# Patient Record
Sex: FEMALE | Race: WHITE | NOT HISPANIC OR LATINO | URBAN - METROPOLITAN AREA
[De-identification: names, ages, dates, MRNs, and addresses within clinical notes are randomized per-mention and may not be internally consistent; named-entity substitution may affect disease eponyms.]

---

## 2020-10-23 ENCOUNTER — OFFICE VISIT (OUTPATIENT)
Dept: URBAN - METROPOLITAN AREA CLINIC 63 | Facility: CLINIC | Age: 65
End: 2020-10-23

## 2022-07-09 ENCOUNTER — TELEPHONE ENCOUNTER (OUTPATIENT)
Dept: URBAN - METROPOLITAN AREA CLINIC 121 | Facility: CLINIC | Age: 67
End: 2022-07-09

## 2022-07-09 RX ORDER — BUPROPION HYDROCHLORIDE 150 MG/1
TABLET, FILM COATED, EXTENDED RELEASE ORAL
Refills: 0 | OUTPATIENT
Start: 2020-01-31 | End: 2020-02-24

## 2022-07-09 RX ORDER — MONTELUKAST SODIUM 10 MG/1
TABLET, FILM COATED ORAL
Refills: 0 | OUTPATIENT
Start: 2018-03-26 | End: 2018-05-09

## 2022-07-09 RX ORDER — ROSUVASTATIN CALCIUM 20 MG
TABLET ORAL
Refills: 0 | OUTPATIENT
Start: 2020-01-09 | End: 2020-01-31

## 2022-07-09 RX ORDER — LORATADINE 5 MG
TABLET,CHEWABLE ORAL THREE TIMES A DAY
Refills: 0 | OUTPATIENT
Start: 2020-01-31 | End: 2020-02-24

## 2022-07-09 RX ORDER — EZETIMIBE 10 MG/1
TABLET ORAL ONCE A DAY
Refills: 0 | OUTPATIENT
Start: 2020-01-31 | End: 2020-02-24

## 2022-07-09 RX ORDER — MONTELUKAST SODIUM 10 MG/1
TABLET, FILM COATED ORAL
Refills: 0 | OUTPATIENT
Start: 2018-05-09 | End: 2020-01-09

## 2022-07-09 RX ORDER — CELECOXIB 200 MG
CAPSULE ORAL
Refills: 0 | OUTPATIENT
Start: 2018-03-26 | End: 2018-05-09

## 2022-07-09 RX ORDER — ZOLMITRIPTAN 5 MG/1
TABLET ORAL
Refills: 0 | OUTPATIENT
Start: 2018-05-09 | End: 2020-01-09

## 2022-07-09 RX ORDER — ZOLMITRIPTAN 5 MG/1
TABLET ORAL
Refills: 0 | OUTPATIENT
Start: 2020-01-09 | End: 2020-01-31

## 2022-07-09 RX ORDER — ZOLMITRIPTAN 5 MG/1
TABLET, FILM COATED ORAL
Refills: 0 | OUTPATIENT
Start: 2019-11-18 | End: 2020-01-09

## 2022-07-09 RX ORDER — OXYCODONE HYDROCHLORIDE 30 MG/1
TABLET ORAL TWICE A DAY
Refills: 0 | OUTPATIENT
Start: 2018-05-09 | End: 2020-01-09

## 2022-07-09 RX ORDER — CELECOXIB 200 MG
CAPSULE ORAL
Refills: 0 | OUTPATIENT
Start: 2020-01-31 | End: 2020-02-24

## 2022-07-09 RX ORDER — OXYCODONE HYDROCHLORIDE 15 MG/1
TABLET ORAL
Refills: 0 | OUTPATIENT
Start: 2020-01-09 | End: 2020-01-31

## 2022-07-09 RX ORDER — OXYCODONE HYDROCHLORIDE 15 MG/1
TABLET ORAL
Refills: 0 | OUTPATIENT
Start: 2019-12-10 | End: 2020-01-09

## 2022-07-09 RX ORDER — CELECOXIB 200 MG
CAPSULE ORAL
Refills: 0 | OUTPATIENT
Start: 2018-05-09 | End: 2020-01-09

## 2022-07-09 RX ORDER — OXYCODONE HYDROCHLORIDE 30 MG/1
TABLET ORAL TWICE A DAY
Refills: 0 | OUTPATIENT
Start: 2018-03-26 | End: 2018-05-09

## 2022-07-09 RX ORDER — CELECOXIB 200 MG
CAPSULE ORAL
Refills: 0 | OUTPATIENT
Start: 2020-01-09 | End: 2020-01-31

## 2022-07-09 RX ORDER — LORATADINE 5 MG
TABLET,CHEWABLE ORAL THREE TIMES A DAY
Refills: 0 | OUTPATIENT
Start: 2020-01-09 | End: 2020-01-31

## 2022-07-09 RX ORDER — ZOLMITRIPTAN 5 MG/1
TABLET ORAL
Refills: 0 | OUTPATIENT
Start: 2020-01-31 | End: 2020-02-24

## 2022-07-09 RX ORDER — BUPROPION HYDROCHLORIDE 150 MG/1
TABLET, FILM COATED, EXTENDED RELEASE ORAL
Refills: 0 | OUTPATIENT
Start: 2020-01-09 | End: 2020-01-31

## 2022-07-09 RX ORDER — ROSUVASTATIN CALCIUM 20 MG
TABLET ORAL
Refills: 0 | OUTPATIENT
Start: 2020-01-31 | End: 2020-02-24

## 2022-07-09 RX ORDER — ZOLMITRIPTAN 5 MG/1
TABLET ORAL
Refills: 0 | OUTPATIENT
Start: 2018-03-26 | End: 2018-05-09

## 2022-07-09 RX ORDER — EZETIMIBE 10 MG/1
TABLET ORAL ONCE A DAY
Refills: 0 | OUTPATIENT
Start: 2020-01-09 | End: 2020-01-31

## 2022-07-09 RX ORDER — ALPRAZOLAM 0.25 MG/1
TABLET ORAL
Refills: 0 | OUTPATIENT
Start: 2020-01-09 | End: 2020-01-09

## 2022-07-09 RX ORDER — ROSUVASTATIN CALCIUM 20 MG
TABLET ORAL
Refills: 0 | OUTPATIENT
Start: 2018-05-09 | End: 2020-01-09

## 2022-07-09 RX ORDER — OXYCODONE HYDROCHLORIDE 15 MG/1
TABLET ORAL
Refills: 0 | OUTPATIENT
Start: 2020-01-31 | End: 2020-02-24

## 2022-07-09 RX ORDER — ALPRAZOLAM 0.25 MG/1
TABLET ORAL
Refills: 0 | OUTPATIENT
Start: 2019-12-19 | End: 2020-01-09

## 2022-07-09 RX ORDER — ROSUVASTATIN CALCIUM 20 MG
TABLET ORAL
Refills: 0 | OUTPATIENT
Start: 2018-03-26 | End: 2018-05-09

## 2022-07-09 RX ORDER — EZETIMIBE 10 MG/1
TABLET ORAL ONCE A DAY
Refills: 0 | OUTPATIENT
Start: 2019-12-31 | End: 2020-01-09

## 2022-07-10 ENCOUNTER — TELEPHONE ENCOUNTER (OUTPATIENT)
Dept: URBAN - METROPOLITAN AREA CLINIC 121 | Facility: CLINIC | Age: 67
End: 2022-07-10

## 2022-07-10 RX ORDER — OXYCODONE HYDROCHLORIDE 15 MG/1
TABLET ORAL
Refills: 0 | Status: ACTIVE | COMMUNITY
Start: 2020-02-24

## 2022-07-10 RX ORDER — CELECOXIB 200 MG
CAPSULE ORAL
Refills: 0 | Status: ACTIVE | COMMUNITY
Start: 2020-02-24

## 2022-07-10 RX ORDER — BUPROPION HYDROCHLORIDE 150 MG/1
TABLET, FILM COATED, EXTENDED RELEASE ORAL
Refills: 0 | Status: ACTIVE | COMMUNITY
Start: 2020-02-24

## 2022-07-10 RX ORDER — ROSUVASTATIN CALCIUM 20 MG
TABLET ORAL
Refills: 0 | Status: ACTIVE | COMMUNITY
Start: 2020-02-24

## 2022-07-10 RX ORDER — DICYCLOMINE HYDROCHLORIDE 20 MG/1
UP FOUR TIMES A DAY, PO, AS NEEDED FOR ABDOMINAL CRAMPING TABLET ORAL
Refills: 3 | Status: ACTIVE | COMMUNITY
Start: 2020-01-09

## 2022-07-10 RX ORDER — EZETIMIBE 10 MG/1
TABLET ORAL ONCE A DAY
Refills: 0 | Status: ACTIVE | COMMUNITY
Start: 2020-02-24

## 2022-07-10 RX ORDER — ZOLMITRIPTAN 5 MG/1
TABLET ORAL
Refills: 0 | Status: ACTIVE | COMMUNITY
Start: 2020-02-24

## 2022-07-10 RX ORDER — LORATADINE 5 MG
TABLET,CHEWABLE ORAL THREE TIMES A DAY
Refills: 0 | Status: ACTIVE | COMMUNITY
Start: 2020-02-24

## 2022-07-10 RX ORDER — LUBIPROSTONE 8 UG/1
TWICE A DAY, PO CAPSULE, GELATIN COATED ORAL TWICE A DAY
Refills: 0 | Status: ACTIVE | COMMUNITY
Start: 2020-02-24

## 2022-10-28 ENCOUNTER — OFFICE VISIT (OUTPATIENT)
Dept: URBAN - METROPOLITAN AREA CLINIC 63 | Facility: CLINIC | Age: 67
End: 2022-10-28

## 2022-11-08 ENCOUNTER — OFFICE VISIT (OUTPATIENT)
Dept: URBAN - METROPOLITAN AREA CLINIC 63 | Facility: CLINIC | Age: 67
End: 2022-11-08

## 2022-11-08 ENCOUNTER — OFFICE VISIT (OUTPATIENT)
Dept: URBAN - METROPOLITAN AREA CLINIC 63 | Facility: CLINIC | Age: 67
End: 2022-11-08
Payer: MEDICARE

## 2022-11-08 ENCOUNTER — WEB ENCOUNTER (OUTPATIENT)
Dept: URBAN - METROPOLITAN AREA CLINIC 63 | Facility: CLINIC | Age: 67
End: 2022-11-08

## 2022-11-08 VITALS
HEART RATE: 87 BPM | HEIGHT: 66 IN | BODY MASS INDEX: 29.89 KG/M2 | TEMPERATURE: 97.9 F | SYSTOLIC BLOOD PRESSURE: 140 MMHG | WEIGHT: 186 LBS | OXYGEN SATURATION: 96 % | DIASTOLIC BLOOD PRESSURE: 90 MMHG

## 2022-11-08 DIAGNOSIS — D12.6 ADENOMATOUS POLYP OF COLON, UNSPECIFIED PART OF COLON: ICD-10-CM

## 2022-11-08 PROCEDURE — 99213 OFFICE O/P EST LOW 20 MIN: CPT | Performed by: INTERNAL MEDICINE

## 2022-11-08 RX ORDER — PREDNISOLONE ACETATE 10 MG/ML
INSTILL ONE DROP INTO THE RIGHT EYE TWICE DAILY - SHAKE WELL SUSPENSION/ DROPS OPHTHALMIC
Qty: 5 UNSPECIFIED | Refills: 0 | Status: ACTIVE | COMMUNITY

## 2022-11-08 RX ORDER — ZOLMITRIPTAN 5 MG/1
TAKE ONE TABLET BY MOUTH ONE TIME DAILY AS NEEDED TABLET, FILM COATED ORAL
Qty: 8 UNSPECIFIED | Refills: 0 | Status: ACTIVE | COMMUNITY

## 2022-11-08 RX ORDER — BUPROPION HYDROCHLORIDE 150 MG/1
TABLET, FILM COATED, EXTENDED RELEASE ORAL
Refills: 0 | Status: ACTIVE | COMMUNITY
Start: 2020-02-24

## 2022-11-08 RX ORDER — OXYCODONE HYDROCHLORIDE 10 MG/1
TABLET ORAL
Qty: 90 TABLET | Status: ACTIVE | COMMUNITY

## 2022-11-08 NOTE — HPI-TODAY'S VISIT:
Rebecca is a pleasant 67-year-old woman with a history of coronary artery disease status post PTCA, hyperlipidemia, chronic renal sufficiency, MDD, family history of "bowel" cancer.  She has surgical history of cholecystectomy, shoulder and knee procedures.  Some chronic constipation for which she had previously but been put on Amitiza 8 mcg daily.  Her most recent colonoscopy was in 2018 which revealed melanosis and a 10 mm rectal tubular adenoma. Today phone reports that she has some intermittent cramping but seems to be improving.  It is relatively short-lived and does not seem to be problematic.  She does take oxycodone for chronic pain.  She tells me that her cardiac issues 15 years ago was a viral pericarditis that resolved.  2 or 3 years ago she had some shortness of breath and went to see cardiologist and had a work-up that was negative.  Her shortness of breath resolved.  Colonoscopy is indicated for surveillance of advanced adenoma.

## 2022-11-15 ENCOUNTER — TELEPHONE ENCOUNTER (OUTPATIENT)
Dept: URBAN - METROPOLITAN AREA CLINIC 63 | Facility: CLINIC | Age: 67
End: 2022-11-15

## 2022-11-18 ENCOUNTER — OFFICE VISIT (OUTPATIENT)
Dept: URBAN - METROPOLITAN AREA SURGERY CENTER 4 | Facility: SURGERY CENTER | Age: 67
End: 2022-11-18
Payer: MEDICARE

## 2022-11-18 ENCOUNTER — CLAIMS CREATED FROM THE CLAIM WINDOW (OUTPATIENT)
Dept: URBAN - METROPOLITAN AREA CLINIC 4 | Facility: CLINIC | Age: 67
End: 2022-11-18
Payer: MEDICARE

## 2022-11-18 DIAGNOSIS — D12.5 SIGMOID POLYP: ICD-10-CM

## 2022-11-18 DIAGNOSIS — K57.30 DIVERTCULOSIS OF LG INT W/O PERFORATION OR ABSCESS W/O BLEEDING: ICD-10-CM

## 2022-11-18 DIAGNOSIS — D12.3 POLYP OF TRANSVERSE COLON: ICD-10-CM

## 2022-11-18 DIAGNOSIS — D12.7 BENIGN NEOPLASM OF RECTOSIGMOID JUNCTION: ICD-10-CM

## 2022-11-18 DIAGNOSIS — K62.1 RECTAL POLYP: ICD-10-CM

## 2022-11-18 DIAGNOSIS — Z86.010 PERSONAL HISTORY OF COLONIC POLYPS: ICD-10-CM

## 2022-11-18 DIAGNOSIS — D12.3 BENIGN NEOPLASM OF TRANSVERSE COLON: ICD-10-CM

## 2022-11-18 PROCEDURE — 88305 TISSUE EXAM BY PATHOLOGIST: CPT | Performed by: PATHOLOGY

## 2022-11-18 PROCEDURE — 45385 COLONOSCOPY W/LESION REMOVAL: CPT | Performed by: CLINIC/CENTER

## 2022-11-18 PROCEDURE — 45380 COLONOSCOPY AND BIOPSY: CPT | Performed by: CLINIC/CENTER

## 2022-11-18 PROCEDURE — 45385 COLONOSCOPY W/LESION REMOVAL: CPT | Performed by: INTERNAL MEDICINE

## 2022-11-18 PROCEDURE — 45380 COLONOSCOPY AND BIOPSY: CPT | Performed by: INTERNAL MEDICINE

## 2022-11-18 RX ORDER — BUPROPION HYDROCHLORIDE 150 MG/1
TABLET, FILM COATED, EXTENDED RELEASE ORAL
Refills: 0 | Status: ACTIVE | COMMUNITY
Start: 2020-02-24

## 2022-11-18 RX ORDER — OXYCODONE HYDROCHLORIDE 10 MG/1
TABLET ORAL
Qty: 90 TABLET | Status: ACTIVE | COMMUNITY

## 2022-11-18 RX ORDER — ZOLMITRIPTAN 5 MG/1
TAKE ONE TABLET BY MOUTH ONE TIME DAILY AS NEEDED TABLET, FILM COATED ORAL
Qty: 8 UNSPECIFIED | Refills: 0 | Status: ACTIVE | COMMUNITY

## 2022-11-18 RX ORDER — PREDNISOLONE ACETATE 10 MG/ML
INSTILL ONE DROP INTO THE RIGHT EYE TWICE DAILY - SHAKE WELL SUSPENSION/ DROPS OPHTHALMIC
Qty: 5 UNSPECIFIED | Refills: 0 | Status: ACTIVE | COMMUNITY

## 2022-12-09 ENCOUNTER — OFFICE VISIT (OUTPATIENT)
Dept: URBAN - METROPOLITAN AREA CLINIC 63 | Facility: CLINIC | Age: 67
End: 2022-12-09

## 2022-12-10 PROBLEM — 398050005 DIVERTICULAR DISEASE OF COLON: Status: ACTIVE | Noted: 2022-12-10

## 2022-12-28 ENCOUNTER — TELEPHONE ENCOUNTER (OUTPATIENT)
Dept: URBAN - METROPOLITAN AREA CLINIC 7 | Facility: CLINIC | Age: 67
End: 2022-12-28

## 2022-12-28 ENCOUNTER — DASHBOARD ENCOUNTERS (OUTPATIENT)
Age: 67
End: 2022-12-28

## 2022-12-28 ENCOUNTER — OFFICE VISIT (OUTPATIENT)
Dept: URBAN - METROPOLITAN AREA CLINIC 63 | Facility: CLINIC | Age: 67
End: 2022-12-28
Payer: MEDICARE

## 2022-12-28 VITALS
OXYGEN SATURATION: 99 % | DIASTOLIC BLOOD PRESSURE: 82 MMHG | SYSTOLIC BLOOD PRESSURE: 134 MMHG | HEART RATE: 77 BPM | HEIGHT: 66 IN | BODY MASS INDEX: 30.22 KG/M2 | WEIGHT: 188 LBS | TEMPERATURE: 96.6 F

## 2022-12-28 DIAGNOSIS — Z80.0 FAMILY HISTORY OF MALIGNANT NEOPLASM OF DIGESTIVE ORGAN: ICD-10-CM

## 2022-12-28 DIAGNOSIS — D12.8 ADENOMATOUS POLYP OF RECTUM: ICD-10-CM

## 2022-12-28 DIAGNOSIS — K57.90 DIVERTICULOSIS: ICD-10-CM

## 2022-12-28 DIAGNOSIS — D12.3 ADENOMATOUS POLYP OF TRANSVERSE COLON: ICD-10-CM

## 2022-12-28 DIAGNOSIS — D12.5 ADENOMATOUS POLYP OF SIGMOID COLON: ICD-10-CM

## 2022-12-28 DIAGNOSIS — K62.1 HYPERPLASTIC RECTAL POLYP: ICD-10-CM

## 2022-12-28 PROBLEM — 398050005 DIVERTICULAR DISEASE OF COLON: Status: ACTIVE | Noted: 2022-12-28

## 2022-12-28 PROCEDURE — 99213 OFFICE O/P EST LOW 20 MIN: CPT | Performed by: NURSE PRACTITIONER

## 2022-12-28 RX ORDER — BUPROPION HYDROCHLORIDE 150 MG/1
TABLET, FILM COATED, EXTENDED RELEASE ORAL
Refills: 0 | Status: ACTIVE | COMMUNITY
Start: 2020-02-24

## 2022-12-28 RX ORDER — ZOLMITRIPTAN 5 MG/1
TAKE ONE TABLET BY MOUTH ONE TIME DAILY AS NEEDED TABLET, FILM COATED ORAL
Qty: 8 UNSPECIFIED | Refills: 0 | Status: ACTIVE | COMMUNITY

## 2022-12-28 RX ORDER — PREDNISOLONE ACETATE 10 MG/ML
INSTILL ONE DROP INTO THE RIGHT EYE TWICE DAILY - SHAKE WELL SUSPENSION/ DROPS OPHTHALMIC
Qty: 5 UNSPECIFIED | Refills: 0 | Status: ACTIVE | COMMUNITY

## 2022-12-28 RX ORDER — OXYCODONE HYDROCHLORIDE 10 MG/1
TABLET ORAL
Qty: 90 TABLET | Status: ACTIVE | COMMUNITY

## 2022-12-28 NOTE — HPI-PREVIOUS PROCEDURES
Colonoscopy/18 November 2022.  6 mm tubular adenomatous polyp removed in the hepatic flexure.  5 mm tubular adenomatous polyp removed from mid transverse colon.  8 mm tubular adenomatous polyp removed from the proximal sigmoid colon.  5 mm hyperplastic polyp removed in the rectosigmoid colon.  12 mm advanced tubular adenomatous polyp removed in the rectum.  Diverticulosis in the sigmoid colon with internal hemorrhoids present.  All remaining findings are negative or benign.  Recommend repeat colonoscopy in 3 years due to advanced adenomatous polyp removed on this procedure and a sororal history of colon cancer.

## 2022-12-28 NOTE — HPI-HPI
Rebecca Newman is a very pleasant 67-year-old female seen in follow-up of colonoscopy (see results below).  She admits to tolerating the procedure very easily without any postprocedure complications.  Her bowel habits have returned to normal when she presents today without gastrointestinal complaint. 19

## 2023-01-05 ENCOUNTER — OFFICE VISIT (OUTPATIENT)
Dept: URBAN - METROPOLITAN AREA CLINIC 60 | Facility: CLINIC | Age: 68
End: 2023-01-05